# Patient Record
Sex: FEMALE | Race: BLACK OR AFRICAN AMERICAN | Employment: FULL TIME | URBAN - METROPOLITAN AREA
[De-identification: names, ages, dates, MRNs, and addresses within clinical notes are randomized per-mention and may not be internally consistent; named-entity substitution may affect disease eponyms.]

---

## 2021-01-30 ENCOUNTER — HOSPITAL ENCOUNTER (EMERGENCY)
Facility: CLINIC | Age: 36
Discharge: HOME | End: 2021-01-31
Attending: NURSE PRACTITIONER | Admitting: NURSE PRACTITIONER

## 2021-01-30 VITALS
HEART RATE: 107 BPM | RESPIRATION RATE: 16 BRPM | DIASTOLIC BLOOD PRESSURE: 68 MMHG | HEIGHT: 66 IN | OXYGEN SATURATION: 100 % | SYSTOLIC BLOOD PRESSURE: 115 MMHG | TEMPERATURE: 97.9 F | BODY MASS INDEX: 32.78 KG/M2 | WEIGHT: 204 LBS

## 2021-01-30 DIAGNOSIS — T78.40XA ALLERGIC REACTION, INITIAL ENCOUNTER: ICD-10-CM

## 2021-01-30 PROCEDURE — 99284 EMERGENCY DEPT VISIT MOD MDM: CPT | Mod: 25

## 2021-01-30 PROCEDURE — 250N000012 HC RX MED GY IP 250 OP 636 PS 637: Performed by: NURSE PRACTITIONER

## 2021-01-30 PROCEDURE — 250N000009 HC RX 250: Performed by: NURSE PRACTITIONER

## 2021-01-30 PROCEDURE — 96374 THER/PROPH/DIAG INJ IV PUSH: CPT

## 2021-01-30 RX ORDER — PREDNISONE 20 MG/1
TABLET ORAL
Qty: 10 TABLET | Refills: 0 | Status: SHIPPED | OUTPATIENT
Start: 2021-01-30

## 2021-01-30 RX ORDER — FAMOTIDINE 20 MG/1
20 TABLET, FILM COATED ORAL 2 TIMES DAILY
Qty: 28 TABLET | Refills: 0 | Status: SHIPPED | OUTPATIENT
Start: 2021-01-30 | End: 2021-02-13

## 2021-01-30 RX ORDER — EPINEPHRINE 0.3 MG/.3ML
0.3 INJECTION SUBCUTANEOUS
Qty: 1 EACH | Refills: 0 | Status: SHIPPED | OUTPATIENT
Start: 2021-01-30

## 2021-01-30 RX ORDER — PREDNISONE 20 MG/1
40 TABLET ORAL ONCE
Status: COMPLETED | OUTPATIENT
Start: 2021-01-30 | End: 2021-01-30

## 2021-01-30 RX ADMIN — PREDNISONE 40 MG: 20 TABLET ORAL at 21:59

## 2021-01-30 RX ADMIN — FAMOTIDINE 20 MG: 10 INJECTION, SOLUTION INTRAVENOUS at 22:00

## 2021-01-30 ASSESSMENT — ENCOUNTER SYMPTOMS
TROUBLE SWALLOWING: 1
SHORTNESS OF BREATH: 1
WHEEZING: 0

## 2021-01-30 ASSESSMENT — MIFFLIN-ST. JEOR: SCORE: 1647.09

## 2021-01-31 NOTE — ED PROVIDER NOTES
"  History   Chief Complaint:  Allergic Reaction     The history is provided by the patient and the EMS personnel.      Elias Gonsalves is a 35 year old female with history of shellfish allergies who presents with allergic reaction. An hour an a half ago had crab, shrimp, and seafood and had onset facial swelling, swollen tongue, and eyes swelling. EMS was called and she had 0.3 mg of epinephrine en route and patient took 100 mg of benadryl at home. EMS states her hoarseness in her breathing improved and did not hear wheezing. Upon evaluation, she states her tongue and facial swelling has reduced. Denies alcohol use before and endorses current smoking.     Review of Systems   HENT: Positive for trouble swallowing.         Tongue swelling     Respiratory: Positive for shortness of breath. Negative for wheezing.    Allergic/Immunologic: Positive for food allergies (shrimp).     Allergies:  Shellfish - Shrimp    Medications:  The patient is not currently taking any prescribed medications.    Past Medical History:    Allergic Reaction     Social History:  Patient arrives via EMS    Physical Exam     Patient Vitals for the past 24 hrs:   BP Temp Temp src Pulse Resp SpO2 Height Weight   01/30/21 2335 115/68 -- -- 107 -- 100 % -- --   01/30/21 2230 105/56 -- -- 95 -- 100 % -- --   01/30/21 2219 -- -- -- -- -- 100 % -- --   01/30/21 2140 114/49 97.9  F (36.6  C) Oral 89 16 100 % 1.676 m (5' 6\") 92.5 kg (204 lb)       Physical Exam  Nursing notes and vital signs reviewed.  General:  Alert. mild discomfort, well kept   Eyes: PERRL, conjunctivae pink no scleral icterus or conjunctival injection. Mild upper eyelid swelling.   ENT:  Moist mucus membranes, posterior oropharynx clear without erythema or exudates.  Uvula midline without edema, no tongue/lip/oropharngeal swelling.  Mallampati I.  No stridor or wheezing.  Respiratory:  Lungs clear to auscultation bilaterally, no crackles/rubs/wheezes.  Good air movement  CV: Normal rate " and rhythm, no murmurs/rubs/gallops  GI:  Abdomen soft and non-distended.  Normoactive BS.  No tenderness, guarding or rebound  Skin: Warm, dry.  No rashes or petechiae. no hives.  Musculoskeletal: No peripheral edema or calf tenderness  Neuro: Alert and oriented to person/place/time  Psychiatric:  Affect normal. Normal personal interaction. Good eye contact    Emergency Department Course     Emergency Department Course:    Reviewed:  I reviewed nursing notes    Assessments:  2142 I obtained history and examined the patient as noted above.   2216 I checked in on the patient and her tongue swelling.   2350 I rechecked the patient and she was improved.     Interventions:  2159 Prednisone 10 mg IV   2200 Pepcid 20 mg IV     Disposition:  The patient was discharged to home.       Impression & Plan     Medical Decision Making:  Elias Morrissey is a 35 year old female who presents for evaluation of allergic reaction.  She had received epinephrine prior to arrival and had resolution of difficulty swallowing and decreased facial swelling upon arrival in the ED.  They look well at discharge and symptoms have stabilized and improved.  We did watch here for 2 hours prior to considering discharge.  Patient was treated here with medications as noted above.  Will send home with epipen, steroids, antihistamines.  Return of allergic reaction or anaphylactic symptoms were discussed with patient and they were instructed to inject epi-pen and call 911 should these symptoms occur. Given the rapidity of resolution, lack of serious systemic symptoms, lack of respiratory difficulty and no oral or pharyngeal swelling, would not admit at this time for anaphylaxis. There is no signs of anaphylactic shock.    Diagnosis:    ICD-10-CM    1. Allergic reaction, initial encounter  T78.40XA      Discharge Medications:  New Prescriptions    EPINEPHRINE (ANY BX GENERIC EQUIV) 0.3 MG/0.3ML INJECTION 2-PACK    Inject 0.3 mLs (0.3 mg) into the muscle  once as needed for anaphylaxis    FAMOTIDINE (PEPCID) 20 MG TABLET    Take 1 tablet (20 mg) by mouth 2 times daily for 14 days    PREDNISONE (DELTASONE) 20 MG TABLET    Take two tablets (= 40mg) each day for 5 (five) days       Scribe Disclosure:  I, Amadou Alston, am serving as a scribe at 9:42 PM on 1/30/2021 to document services personally performed by Simona Tellez, IRMA based on my observations and the provider's statements to me.          Simona Tellez, CNP  01/31/21 0025

## 2021-01-31 NOTE — ED TRIAGE NOTES
Patient comes in via EMS for an allergic reaction.  Around 2030 patient had some crab.  Patient has a history of shellfish allergies.  Patient had facial welling, tongue swelling and hoarse voice.  Patient took 100mg Bendadryl at home.  Ems gave 0.3mg of epi and a douneb.  Patients tongue swelling improved.  Some eye swelling noted.  Hoarse voice still.  States symptoms are improving.

## 2021-01-31 NOTE — ED NOTES
Bed: ED02  Expected date:   Expected time:   Means of arrival:   Comments:  535  35F Allergic reaction  2129